# Patient Record
Sex: FEMALE
[De-identification: names, ages, dates, MRNs, and addresses within clinical notes are randomized per-mention and may not be internally consistent; named-entity substitution may affect disease eponyms.]

---

## 2018-07-02 ENCOUNTER — HOSPITAL ENCOUNTER (EMERGENCY)
Dept: HOSPITAL 88 - ER | Age: 33
Discharge: OTHER INSTITUTION | End: 2018-07-02
Payer: COMMERCIAL

## 2018-07-02 VITALS — HEIGHT: 63 IN | WEIGHT: 110 LBS | BODY MASS INDEX: 19.49 KG/M2

## 2018-07-02 VITALS — SYSTOLIC BLOOD PRESSURE: 117 MMHG | DIASTOLIC BLOOD PRESSURE: 70 MMHG

## 2018-07-02 DIAGNOSIS — N93.9: ICD-10-CM

## 2018-07-02 DIAGNOSIS — R10.2: Primary | ICD-10-CM

## 2018-07-02 DIAGNOSIS — N93.0: ICD-10-CM

## 2018-07-02 LAB
ALBUMIN SERPL-MCNC: 4.4 G/DL (ref 3.5–5)
ALBUMIN/GLOB SERPL: 1.8 {RATIO} (ref 0.8–2)
ALP SERPL-CCNC: 59 IU/L (ref 40–150)
ALT SERPL-CCNC: 10 IU/L (ref 0–55)
ANION GAP SERPL CALC-SCNC: 13.3 MMOL/L (ref 8–16)
BASOPHILS # BLD AUTO: 0 10*3/UL (ref 0–0.1)
BASOPHILS NFR BLD AUTO: 0.6 % (ref 0–1)
BUN SERPL-MCNC: 14 MG/DL (ref 7–26)
BUN/CREAT SERPL: 17 (ref 6–25)
CALCIUM SERPL-MCNC: 9.5 MG/DL (ref 8.4–10.2)
CHLORIDE SERPL-SCNC: 110 MMOL/L (ref 98–107)
CO2 SERPL-SCNC: 23 MMOL/L (ref 22–29)
DEPRECATED APTT PLAS QN: 28.8 SECONDS (ref 23.8–35.5)
DEPRECATED INR PLAS: 1.13
DEPRECATED NEUTROPHILS # BLD AUTO: 4.5 10*3/UL (ref 2.1–6.9)
EGFRCR SERPLBLD CKD-EPI 2021: > 60 ML/MIN (ref 60–?)
EOSINOPHIL # BLD AUTO: 0.1 10*3/UL (ref 0–0.4)
EOSINOPHIL NFR BLD AUTO: 1.1 % (ref 0–6)
ERYTHROCYTE [DISTWIDTH] IN CORD BLOOD: 13.2 % (ref 11.7–14.4)
GLOBULIN PLAS-MCNC: 2.4 G/DL (ref 2.3–3.5)
GLUCOSE SERPLBLD-MCNC: 114 MG/DL (ref 74–118)
HCT VFR BLD AUTO: 30.8 % (ref 34.2–44.1)
HCT VFR BLD AUTO: 37 % (ref 34.2–44.1)
HGB BLD-MCNC: 10.5 G/DL (ref 12–16)
HGB BLD-MCNC: 12.5 G/DL (ref 12–16)
LYMPHOCYTES # BLD: 1.3 10*3/UL (ref 1–3.2)
LYMPHOCYTES NFR BLD AUTO: 19.9 % (ref 18–39.1)
MCH RBC QN AUTO: 32.3 PG (ref 28–32)
MCHC RBC AUTO-ENTMCNC: 33.8 G/DL (ref 31–35)
MCV RBC AUTO: 95.6 FL (ref 81–99)
MONOCYTES # BLD AUTO: 0.4 10*3/UL (ref 0.2–0.8)
MONOCYTES NFR BLD AUTO: 6.7 % (ref 4.4–11.3)
NEUTS SEG NFR BLD AUTO: 71.1 % (ref 38.7–80)
PLATELET # BLD AUTO: 203 X10E3/UL (ref 140–360)
POTASSIUM SERPL-SCNC: 3.3 MMOL/L (ref 3.5–5.1)
PROTHROMBIN TIME: 13.6 SECONDS (ref 11.9–14.5)
RBC # BLD AUTO: 3.87 X10E6/UL (ref 3.6–5.1)
SODIUM SERPL-SCNC: 143 MMOL/L (ref 136–145)

## 2018-07-02 PROCEDURE — 85014 HEMATOCRIT: CPT

## 2018-07-02 PROCEDURE — 99284 EMERGENCY DEPT VISIT MOD MDM: CPT

## 2018-07-02 PROCEDURE — 36415 COLL VENOUS BLD VENIPUNCTURE: CPT

## 2018-07-02 PROCEDURE — 85018 HEMOGLOBIN: CPT

## 2018-07-02 PROCEDURE — 85730 THROMBOPLASTIN TIME PARTIAL: CPT

## 2018-07-02 PROCEDURE — 72193 CT PELVIS W/DYE: CPT

## 2018-07-02 PROCEDURE — 80053 COMPREHEN METABOLIC PANEL: CPT

## 2018-07-02 PROCEDURE — 84702 CHORIONIC GONADOTROPIN TEST: CPT

## 2018-07-02 PROCEDURE — 85610 PROTHROMBIN TIME: CPT

## 2018-07-02 PROCEDURE — 85025 COMPLETE CBC W/AUTO DIFF WBC: CPT

## 2018-07-02 NOTE — DIAGNOSTIC IMAGING REPORT
PROCEDURE:CT PELVIS WITH CONTRAST

 

COMPARISON:None.

 

INDICATIONS:VAGINAL BLEED AFTER INTERCOURSE

 

TECHNIQUE:After obtaining the patient's consent, CT images were 

obtained with non-ionic intravenous contrast material.  

 

CONTRAST:100 cc Isovue 370

 

FINDINGS:

PELVIC NODES:No inguinal or pelvic sidewall lymphadenopathy..

PELVIC ORGANS:The urinary bladder is collapsed and poorly evaluated. 

The uterus is anteflexed and appears normal. No adnexal mass. A balloon 

retention catheter lies in the upper vagina with a heterogeneous high 

attenuation material alongside within the vaginal canal presumably 

representing hematoma.

BONES:No osseous destructive lesions. No focal soft tissue 

abnormalities.

OTHER:Visualized small and large bowel show no distention or wall 

thickening. The appendix is normal. Iliac arterial systems are 

well-visualized and patent. No free fluid. No ascites. No 

pneumoperitoneum..

 

CONCLUSION:

 Distention of the vaginal canal with heterogeneous material presumably 

representing blood products in the setting of vaginal bleeding after 

intercourse. No free air or free pelvic fluid.

 

A balloon retention catheter tip lies in the region of the right-sided 

vaginal fornix. 

Dictated by:  Nicko Escobar M.D. on 7/02/2018 at 7:16     

Electronically approved by:  Nicko Escobar M.D. on 7/02/2018 at 7:16